# Patient Record
Sex: FEMALE | Race: WHITE | NOT HISPANIC OR LATINO | Employment: OTHER | URBAN - METROPOLITAN AREA
[De-identification: names, ages, dates, MRNs, and addresses within clinical notes are randomized per-mention and may not be internally consistent; named-entity substitution may affect disease eponyms.]

---

## 2017-07-13 ENCOUNTER — HISTORICAL (OUTPATIENT)
Dept: LAB | Facility: HOSPITAL | Age: 82
End: 2017-07-13

## 2017-07-13 LAB
BUN SERPL-MCNC: 37 MG/DL (ref 7–18)
CALCIUM SERPL-MCNC: 9 MG/DL (ref 8.5–10.1)
CHLORIDE SERPL-SCNC: 105 MMOL/L (ref 98–107)
CO2 SERPL-SCNC: 28 MMOL/L (ref 21–32)
CREAT SERPL-MCNC: 1.34 MG/DL (ref 0.55–1.02)
GLUCOSE SERPL-MCNC: 163 MG/DL (ref 74–106)
POTASSIUM SERPL-SCNC: 4.4 MMOL/L (ref 3.5–5.1)
SODIUM SERPL-SCNC: 140 MMOL/L (ref 136–145)

## 2017-08-02 ENCOUNTER — HISTORICAL (OUTPATIENT)
Dept: LAB | Facility: HOSPITAL | Age: 82
End: 2017-08-02

## 2017-08-02 LAB
FERRITIN SERPL-MCNC: 251.9 NG/ML (ref 8–388)
FOLATE SERPL-MCNC: 68.8 NG/ML (ref 3.1–17.5)
IRON SATN MFR SERPL: 29.3 % (ref 20–50)
IRON SERPL-MCNC: 90 MCG/DL (ref 50–175)
TIBC SERPL-MCNC: 307 MCG/DL (ref 250–450)
TRANSFERRIN SERPL-MCNC: 239 MG/DL (ref 200–360)
VIT B12 SERPL-MCNC: 385 PG/ML (ref 193–986)

## 2018-03-14 ENCOUNTER — HISTORICAL (OUTPATIENT)
Dept: ADMINISTRATIVE | Facility: HOSPITAL | Age: 83
End: 2018-03-14

## 2018-03-14 LAB
ABS NEUT (OLG): 2.7
ALBUMIN SERPL-MCNC: 4.6 GM/DL (ref 3.4–5)
ALBUMIN/GLOB SERPL: 2.04 {RATIO} (ref 1.5–2.5)
ALP SERPL-CCNC: 59 UNIT/L (ref 38–126)
ALT SERPL-CCNC: 13 UNIT/L (ref 7–52)
AST SERPL-CCNC: 21 UNIT/L (ref 15–37)
BILIRUB SERPL-MCNC: 0.6 MG/DL (ref 0.2–1)
BILIRUBIN DIRECT+TOT PNL SERPL-MCNC: 0.2 MG/DL (ref 0–0.5)
BUN SERPL-MCNC: 27 MG/DL (ref 7–18)
CALCIUM SERPL-MCNC: 9.5 MG/DL (ref 8.5–10)
CHLORIDE SERPL-SCNC: 103 MMOL/L (ref 98–107)
CO2 SERPL-SCNC: 31 MMOL/L (ref 21–32)
CREAT SERPL-MCNC: 0.96 MG/DL (ref 0.6–1.3)
CREAT/UREA NIT SERPL: 28.1
ERYTHROCYTE [DISTWIDTH] IN BLOOD BY AUTOMATED COUNT: 13.8 % (ref 11.5–17)
GGT SERPL-CCNC: 16 UNIT/L (ref 5–85)
GLOBULIN SER-MCNC: 2.3 GM/DL (ref 1.2–3)
GLUCOSE SERPL-MCNC: 108 MG/DL (ref 74–106)
HCT VFR BLD AUTO: 36.8 % (ref 37–47)
HGB BLD-MCNC: 12.3 GM/DL (ref 12–16)
LDH SERPL-CCNC: 161 UNIT/L (ref 140–271)
LYMPHOCYTES # BLD AUTO: 1.9 X10(3)/MCL (ref 0.6–3.4)
LYMPHOCYTES NFR BLD AUTO: 35.7 % (ref 13–40)
MCH RBC QN AUTO: 31.9 PG (ref 27–31.2)
MCHC RBC AUTO-ENTMCNC: 33 GM/DL (ref 32–36)
MCV RBC AUTO: 95 FL (ref 80–94)
MONOCYTES # BLD AUTO: 0.6 X10(3)/MCL (ref 0–1.8)
MONOCYTES NFR BLD AUTO: 11.5 % (ref 0.1–24)
NEUTROPHILS NFR BLD AUTO: 52.8 % (ref 47–80)
PLATELET # BLD AUTO: 155 X10(3)/MCL (ref 130–400)
PMV BLD AUTO: 10.7 FL
POTASSIUM SERPL-SCNC: 4.3 MMOL/L (ref 3.5–5.1)
PROT SERPL-MCNC: 6.9 GM/DL (ref 6.4–8.2)
RBC # BLD AUTO: 3.86 X10(6)/MCL (ref 4.2–5.4)
SODIUM SERPL-SCNC: 139 MMOL/L (ref 136–145)
TSH SERPL-ACNC: 3.15 MIU/ML (ref 0.35–4.94)
WBC # SPEC AUTO: 5.2 X10(3)/MCL (ref 4.5–11.5)

## 2018-09-19 ENCOUNTER — HISTORICAL (OUTPATIENT)
Dept: ADMINISTRATIVE | Facility: HOSPITAL | Age: 83
End: 2018-09-19

## 2018-09-19 LAB
ALBUMIN SERPL-MCNC: 4.6 GM/DL (ref 3.4–5)
ALBUMIN/GLOB SERPL: 1.77 {RATIO} (ref 1.5–2.5)
ALP SERPL-CCNC: 56 UNIT/L (ref 38–126)
ALT SERPL-CCNC: 11 UNIT/L (ref 7–52)
AST SERPL-CCNC: 20 UNIT/L (ref 15–37)
BILIRUB SERPL-MCNC: 0.5 MG/DL (ref 0.2–1)
BILIRUBIN DIRECT+TOT PNL SERPL-MCNC: 0.2 MG/DL (ref 0–0.5)
BILIRUBIN DIRECT+TOT PNL SERPL-MCNC: 0.3 MG/DL
BUN SERPL-MCNC: 24 MG/DL (ref 7–18)
CALCIUM SERPL-MCNC: 9.2 MG/DL (ref 8.5–10)
CHLORIDE SERPL-SCNC: 98 MMOL/L (ref 98–107)
CO2 SERPL-SCNC: 26 MMOL/L (ref 21–32)
CREAT SERPL-MCNC: 1.15 MG/DL (ref 0.6–1.3)
GLOBULIN SER-MCNC: 2.6 GM/DL (ref 1.2–3)
GLUCOSE SERPL-MCNC: 109 MG/DL (ref 74–106)
POTASSIUM SERPL-SCNC: 3.7 MMOL/L (ref 3.5–5.1)
PROT SERPL-MCNC: 7.2 GM/DL (ref 6.4–8.2)
SODIUM SERPL-SCNC: 135 MMOL/L (ref 136–145)
TSH SERPL-ACNC: 2.85 MIU/ML (ref 0.35–4.94)

## 2019-04-16 ENCOUNTER — HISTORICAL (OUTPATIENT)
Dept: LAB | Facility: HOSPITAL | Age: 84
End: 2019-04-16

## 2019-04-16 ENCOUNTER — HISTORICAL (OUTPATIENT)
Dept: ADMINISTRATIVE | Facility: HOSPITAL | Age: 84
End: 2019-04-16

## 2019-04-16 LAB
ALBUMIN SERPL-MCNC: 4.4 GM/DL (ref 3.4–5)
ALBUMIN/GLOB SERPL: 1.76 {RATIO} (ref 1.5–2.5)
ALP SERPL-CCNC: 45 UNIT/L (ref 38–126)
ALT SERPL-CCNC: 14 UNIT/L (ref 7–52)
AST SERPL-CCNC: 22 UNIT/L (ref 15–37)
BILIRUB SERPL-MCNC: 0.6 MG/DL (ref 0.2–1)
BILIRUBIN DIRECT+TOT PNL SERPL-MCNC: 0.1 MG/DL (ref 0–0.5)
BILIRUBIN DIRECT+TOT PNL SERPL-MCNC: 0.5 MG/DL
BUN SERPL-MCNC: 30 MG/DL (ref 7–18)
CALCIUM SERPL-MCNC: 9.2 MG/DL (ref 8.5–10)
CHLORIDE SERPL-SCNC: 102 MMOL/L (ref 98–107)
CO2 SERPL-SCNC: 31 MMOL/L (ref 21–32)
CREAT SERPL-MCNC: 1.05 MG/DL (ref 0.6–1.3)
GLOBULIN SER-MCNC: 2.5 GM/DL (ref 1.2–3)
GLUCOSE SERPL-MCNC: 111 MG/DL (ref 74–106)
POTASSIUM SERPL-SCNC: 4.2 MMOL/L (ref 3.5–5.1)
PREALB SERPL-MCNC: 23.9 MG/DL (ref 18–38)
PROT SERPL-MCNC: 6.9 GM/DL (ref 6.4–8.2)
SODIUM SERPL-SCNC: 140 MMOL/L (ref 136–145)
TSH SERPL-ACNC: 3.84 MIU/ML (ref 0.35–4.94)

## 2019-07-29 ENCOUNTER — HISTORICAL (OUTPATIENT)
Dept: ADMINISTRATIVE | Facility: HOSPITAL | Age: 84
End: 2019-07-29

## 2019-09-30 ENCOUNTER — HISTORICAL (OUTPATIENT)
Dept: ADMINISTRATIVE | Facility: HOSPITAL | Age: 84
End: 2019-09-30

## 2019-09-30 LAB
ALBUMIN SERPL-MCNC: 4.2 GM/DL (ref 3.4–5)
ALBUMIN/GLOB SERPL: 1.91 {RATIO} (ref 1.5–2.5)
ALP SERPL-CCNC: 60 UNIT/L (ref 38–126)
ALT SERPL-CCNC: 11 UNIT/L (ref 7–52)
AST SERPL-CCNC: 20 UNIT/L (ref 15–37)
BILIRUB SERPL-MCNC: 0.4 MG/DL (ref 0.2–1)
BILIRUBIN DIRECT+TOT PNL SERPL-MCNC: 0.1 MG/DL (ref 0–0.5)
BILIRUBIN DIRECT+TOT PNL SERPL-MCNC: 0.3 MG/DL
BUN SERPL-MCNC: 35 MG/DL (ref 7–18)
CALCIUM SERPL-MCNC: 9 MG/DL (ref 8.5–10)
CHLORIDE SERPL-SCNC: 104 MMOL/L (ref 98–107)
CO2 SERPL-SCNC: 25 MMOL/L (ref 21–32)
CREAT SERPL-MCNC: 1.17 MG/DL (ref 0.6–1.3)
DEPRECATED CALCIDIOL+CALCIFEROL SERPL-MC: 57.1 NG/ML (ref 30–80)
GLOBULIN SER-MCNC: 1.7 GM/DL (ref 1.2–3)
GLUCOSE SERPL-MCNC: 103 MG/DL (ref 74–106)
POTASSIUM SERPL-SCNC: 4.9 MMOL/L (ref 3.5–5.1)
PROT SERPL-MCNC: 6.4 GM/DL (ref 6.4–8.2)
SODIUM SERPL-SCNC: 137 MMOL/L (ref 136–145)
TSH SERPL-ACNC: 3.08 MIU/ML (ref 0.35–4.94)

## 2020-02-17 ENCOUNTER — HISTORICAL (OUTPATIENT)
Dept: ADMINISTRATIVE | Facility: HOSPITAL | Age: 85
End: 2020-02-17

## 2020-02-17 LAB
ALBUMIN SERPL-MCNC: 4.4 GM/DL (ref 3.4–5)
ALBUMIN/GLOB SERPL: 1.69 {RATIO} (ref 1.5–2.5)
ALP SERPL-CCNC: 59 UNIT/L (ref 38–126)
ALT SERPL-CCNC: 12 UNIT/L (ref 7–52)
AST SERPL-CCNC: 23 UNIT/L (ref 15–37)
BILIRUB SERPL-MCNC: 0.5 MG/DL (ref 0.2–1)
BILIRUBIN DIRECT+TOT PNL SERPL-MCNC: 0.1 MG/DL (ref 0–0.5)
BILIRUBIN DIRECT+TOT PNL SERPL-MCNC: 0.4 MG/DL
BUN SERPL-MCNC: 32 MG/DL (ref 7–18)
CALCIUM SERPL-MCNC: 9.4 MG/DL (ref 8.5–10)
CHLORIDE SERPL-SCNC: 104 MMOL/L (ref 98–107)
CO2 SERPL-SCNC: 29 MMOL/L (ref 21–32)
CREAT SERPL-MCNC: 1.12 MG/DL (ref 0.6–1.3)
GLOBULIN SER-MCNC: 2.6 GM/DL (ref 1.2–3)
GLUCOSE SERPL-MCNC: 102 MG/DL (ref 74–106)
POTASSIUM SERPL-SCNC: 4.7 MMOL/L (ref 3.5–5.1)
PROT SERPL-MCNC: 7 GM/DL (ref 6.4–8.2)
SODIUM SERPL-SCNC: 139 MMOL/L (ref 136–145)
TSH SERPL-ACNC: 5.9 MIU/ML (ref 0.35–4.94)

## 2020-08-17 ENCOUNTER — HISTORICAL (OUTPATIENT)
Dept: ADMINISTRATIVE | Facility: HOSPITAL | Age: 85
End: 2020-08-17

## 2020-08-17 LAB
ALBUMIN SERPL-MCNC: 4.8 GM/DL (ref 3.4–5)
ALBUMIN/GLOB SERPL: 1.92 {RATIO} (ref 1.5–2.5)
ALP SERPL-CCNC: 60 UNIT/L (ref 38–126)
ALT SERPL-CCNC: 11 UNIT/L (ref 7–52)
AST SERPL-CCNC: 22 UNIT/L (ref 15–37)
BILIRUB SERPL-MCNC: 0.4 MG/DL (ref 0.2–1)
BILIRUBIN DIRECT+TOT PNL SERPL-MCNC: 0.1 MG/DL (ref 0–0.5)
BILIRUBIN DIRECT+TOT PNL SERPL-MCNC: 0.3 MG/DL
BUN SERPL-MCNC: 39 MG/DL (ref 7–18)
CALCIUM SERPL-MCNC: 9.9 MG/DL (ref 8.5–10)
CHLORIDE SERPL-SCNC: 102 MMOL/L (ref 98–107)
CO2 SERPL-SCNC: 25 MMOL/L (ref 21–32)
CREAT SERPL-MCNC: 1.34 MG/DL (ref 0.6–1.3)
GLOBULIN SER-MCNC: 2.5 GM/DL (ref 1.2–3)
GLUCOSE SERPL-MCNC: 123 MG/DL (ref 74–106)
POTASSIUM SERPL-SCNC: 4.9 MMOL/L (ref 3.5–5.1)
PROT SERPL-MCNC: 7.3 GM/DL (ref 6.4–8.2)
SODIUM SERPL-SCNC: 137 MMOL/L (ref 136–145)
TSH SERPL-ACNC: 4.05 MIU/ML (ref 0.35–4.94)

## 2020-12-07 ENCOUNTER — HISTORICAL (OUTPATIENT)
Dept: ADMINISTRATIVE | Facility: HOSPITAL | Age: 85
End: 2020-12-07

## 2020-12-07 LAB — TSH SERPL-ACNC: 2.33 MIU/ML (ref 0.35–4.94)

## 2021-04-22 ENCOUNTER — HISTORICAL (OUTPATIENT)
Dept: ADMINISTRATIVE | Facility: HOSPITAL | Age: 86
End: 2021-04-22

## 2021-04-22 LAB — TSH SERPL-ACNC: 2.63 MIU/ML (ref 0.35–4.94)

## 2021-07-29 ENCOUNTER — HISTORICAL (OUTPATIENT)
Dept: ADMINISTRATIVE | Facility: HOSPITAL | Age: 86
End: 2021-07-29

## 2021-07-29 LAB
ABS NEUT (OLG): 3.8 X10(3)/MCL (ref 2.1–9.2)
ALBUMIN SERPL-MCNC: 4.6 GM/DL (ref 3.4–5)
ALBUMIN/GLOB SERPL: 1.92 {RATIO} (ref 1.5–2.5)
ALP SERPL-CCNC: 40 UNIT/L (ref 38–126)
ALT SERPL-CCNC: 13 UNIT/L (ref 7–52)
AST SERPL-CCNC: 22 UNIT/L (ref 15–37)
BILIRUB SERPL-MCNC: 0.4 MG/DL (ref 0.2–1)
BILIRUBIN DIRECT+TOT PNL SERPL-MCNC: 0.1 MG/DL (ref 0–0.5)
BILIRUBIN DIRECT+TOT PNL SERPL-MCNC: 0.3 MG/DL
BUN SERPL-MCNC: 31 MG/DL (ref 7–18)
CALCIUM SERPL-MCNC: 9.2 MG/DL (ref 8.5–10)
CHLORIDE SERPL-SCNC: 103 MMOL/L (ref 98–107)
CO2 SERPL-SCNC: 28 MMOL/L (ref 21–32)
CREAT SERPL-MCNC: 1.09 MG/DL (ref 0.6–1.3)
ERYTHROCYTE [DISTWIDTH] IN BLOOD BY AUTOMATED COUNT: 14.1 % (ref 11.5–17)
GLOBULIN SER-MCNC: 2.5 GM/DL (ref 1.2–3)
GLUCOSE SERPL-MCNC: 103 MG/DL (ref 74–106)
HCT VFR BLD AUTO: 34.4 % (ref 37–47)
HGB BLD-MCNC: 10.8 GM/DL (ref 12–16)
LYMPHOCYTES # BLD AUTO: 1.7 X10(3)/MCL (ref 0.6–3.4)
LYMPHOCYTES NFR BLD AUTO: 29 % (ref 13–40)
MCH RBC QN AUTO: 30.5 PG (ref 27–31.2)
MCHC RBC AUTO-ENTMCNC: 31 GM/DL (ref 32–36)
MCV RBC AUTO: 97 FL (ref 80–94)
MONOCYTES # BLD AUTO: 0.5 X10(3)/MCL (ref 0.1–1.3)
MONOCYTES NFR BLD AUTO: 8.2 % (ref 0.1–24)
NEUTROPHILS NFR BLD AUTO: 62.8 % (ref 47–80)
PLATELET # BLD AUTO: 136 X10(3)/MCL (ref 130–400)
PMV BLD AUTO: 10.8 FL (ref 9.4–12.4)
POTASSIUM SERPL-SCNC: 4.7 MMOL/L (ref 3.5–5.1)
PROT SERPL-MCNC: 7 GM/DL (ref 6.4–8.2)
RBC # BLD AUTO: 3.54 X10(6)/MCL (ref 4.2–5.4)
SODIUM SERPL-SCNC: 140 MMOL/L (ref 136–145)
TSH SERPL-ACNC: 3.27 MIU/ML (ref 0.35–4.94)
WBC # SPEC AUTO: 6 X10(3)/MCL (ref 4.5–11.5)

## 2021-11-29 ENCOUNTER — HISTORICAL (OUTPATIENT)
Dept: ADMINISTRATIVE | Facility: HOSPITAL | Age: 86
End: 2021-11-29

## 2021-11-29 LAB
ABS NEUT (OLG): 2.4 X10(3)/MCL (ref 2.1–9.2)
ERYTHROCYTE [DISTWIDTH] IN BLOOD BY AUTOMATED COUNT: 13.7 % (ref 11.5–17)
HCT VFR BLD AUTO: 33.5 % (ref 37–47)
HGB BLD-MCNC: 10.6 GM/DL (ref 12–16)
LYMPHOCYTES # BLD AUTO: 1.5 X10(3)/MCL (ref 0.6–3.4)
LYMPHOCYTES NFR BLD AUTO: 34.3 % (ref 13–40)
MCH RBC QN AUTO: 30.5 PG (ref 27–31.2)
MCHC RBC AUTO-ENTMCNC: 32 GM/DL (ref 32–36)
MCV RBC AUTO: 96 FL (ref 80–94)
MONOCYTES # BLD AUTO: 0.4 X10(3)/MCL (ref 0.1–1.3)
MONOCYTES NFR BLD AUTO: 9.5 % (ref 0.1–24)
NEUTROPHILS NFR BLD AUTO: 56.2 % (ref 47–80)
PLATELET # BLD AUTO: 156 X10(3)/MCL (ref 130–400)
PMV BLD AUTO: 10.9 FL (ref 9.4–12.4)
RBC # BLD AUTO: 3.48 X10(6)/MCL (ref 4.2–5.4)
TSH SERPL-ACNC: 2.69 MIU/ML (ref 0.35–4.94)
WBC # SPEC AUTO: 4.3 X10(3)/MCL (ref 4.5–11.5)

## 2022-04-11 ENCOUNTER — HISTORICAL (OUTPATIENT)
Dept: ADMINISTRATIVE | Facility: HOSPITAL | Age: 87
End: 2022-04-11

## 2022-04-24 VITALS
SYSTOLIC BLOOD PRESSURE: 128 MMHG | WEIGHT: 103.63 LBS | HEIGHT: 65 IN | OXYGEN SATURATION: 92 % | BODY MASS INDEX: 17.27 KG/M2 | DIASTOLIC BLOOD PRESSURE: 72 MMHG

## 2022-05-31 PROBLEM — R06.00 DYSPNEA: Status: ACTIVE | Noted: 2022-05-31

## 2022-05-31 PROBLEM — I10 PRIMARY HYPERTENSION: Status: ACTIVE | Noted: 2022-05-31

## 2022-05-31 PROBLEM — E03.9 HYPOTHYROIDISM: Status: ACTIVE | Noted: 2022-05-31

## 2022-05-31 PROBLEM — M81.0 AGE-RELATED OSTEOPOROSIS WITHOUT CURRENT PATHOLOGICAL FRACTURE: Status: ACTIVE | Noted: 2022-05-31

## 2022-06-13 PROCEDURE — 83921 ORGANIC ACID SINGLE QUANT: CPT | Performed by: FAMILY MEDICINE

## 2022-06-13 PROCEDURE — 82607 VITAMIN B-12: CPT | Performed by: FAMILY MEDICINE

## 2022-06-13 PROCEDURE — 82746 ASSAY OF FOLIC ACID SERUM: CPT | Performed by: FAMILY MEDICINE

## 2022-06-13 PROCEDURE — 83540 ASSAY OF IRON: CPT | Performed by: FAMILY MEDICINE

## 2022-11-30 PROBLEM — F41.9 ANXIETY: Status: ACTIVE | Noted: 2022-11-30

## 2022-11-30 PROBLEM — I07.1 TRICUSPID VALVE INSUFFICIENCY: Status: ACTIVE | Noted: 2022-11-30

## 2022-11-30 PROBLEM — J45.909 ASTHMA: Status: ACTIVE | Noted: 2022-11-30

## 2022-11-30 PROBLEM — G47.00 INSOMNIA: Status: ACTIVE | Noted: 2022-11-30

## 2022-11-30 PROBLEM — J30.2 SEASONAL ALLERGIC RHINITIS: Status: ACTIVE | Noted: 2022-11-30

## 2022-11-30 PROBLEM — I27.20 PULMONARY HYPERTENSION: Status: ACTIVE | Noted: 2022-11-30

## 2022-11-30 PROBLEM — H40.9 GLAUCOMA: Status: ACTIVE | Noted: 2022-11-30

## 2022-11-30 PROBLEM — F32.A DEPRESSIVE DISORDER: Status: ACTIVE | Noted: 2022-11-30

## 2022-11-30 PROBLEM — Z80.0 FAMILY HISTORY OF MALIGNANT NEOPLASM OF COLON: Status: ACTIVE | Noted: 2022-11-30

## 2022-11-30 PROBLEM — D64.9 ANEMIA: Status: ACTIVE | Noted: 2022-11-30

## 2022-11-30 PROBLEM — M48.50XA COMPRESSION FRACTURE OF VERTEBRA: Status: ACTIVE | Noted: 2022-11-30

## 2022-11-30 PROBLEM — I83.90 VARICOSE VEINS: Status: ACTIVE | Noted: 2022-11-30

## 2022-11-30 PROBLEM — M85.80 OSTEOPENIA: Status: ACTIVE | Noted: 2022-11-30

## 2023-03-16 PROBLEM — N18.32 STAGE 3B CHRONIC KIDNEY DISEASE: Status: ACTIVE | Noted: 2023-03-16

## 2024-09-05 PROBLEM — F03.90 DEMENTIA: Status: ACTIVE | Noted: 2024-09-05

## 2024-12-14 ENCOUNTER — HOSPITAL ENCOUNTER (EMERGENCY)
Facility: HOSPITAL | Age: 89
Discharge: HOME | End: 2024-12-14
Attending: STUDENT IN AN ORGANIZED HEALTH CARE EDUCATION/TRAINING PROGRAM
Payer: MEDICARE

## 2024-12-14 VITALS
TEMPERATURE: 98 F | DIASTOLIC BLOOD PRESSURE: 96 MMHG | WEIGHT: 110 LBS | HEIGHT: 64 IN | SYSTOLIC BLOOD PRESSURE: 165 MMHG | HEART RATE: 69 BPM | OXYGEN SATURATION: 99 % | RESPIRATION RATE: 17 BRPM | BODY MASS INDEX: 18.78 KG/M2

## 2024-12-14 DIAGNOSIS — M25.572 ACUTE LEFT ANKLE PAIN: ICD-10-CM

## 2024-12-14 DIAGNOSIS — S09.90XA CLOSED HEAD INJURY, INITIAL ENCOUNTER: Primary | ICD-10-CM
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
## 2024-12-14 DIAGNOSIS — S00.81XA ABRASION OF FOREHEAD, INITIAL ENCOUNTER: ICD-10-CM

## 2024-12-14 PROCEDURE — 25000003 PHARM REV CODE 250: Performed by: STUDENT IN AN ORGANIZED HEALTH CARE EDUCATION/TRAINING PROGRAM

## 2024-12-14 PROCEDURE — 99284 EMERGENCY DEPT VISIT MOD MDM: CPT | Mod: 25

## 2024-12-14 RX ORDER — BACITRACIN ZINC 500 UNIT/G
OINTMENT (GRAM) TOPICAL 2 TIMES DAILY
Qty: 30 G | Refills: 0 | Status: SHIPPED | OUTPATIENT
Start: 2024-12-14

## 2024-12-14 RX ORDER — BACITRACIN 500 [USP'U]/G
OINTMENT TOPICAL
Status: COMPLETED | OUTPATIENT
Start: 2024-12-14 | End: 2024-12-14

## 2024-12-14 RX ADMIN — BACITRACIN: 500 OINTMENT TOPICAL at 01:12

## 2024-12-14 NOTE — DISCHARGE INSTRUCTIONS

## 2024-12-14 NOTE — ED PROVIDER NOTES
Encounter Date: 12/14/2024    SCRIBE #1 NOTE: I, Twila Duran, am scribing for, and in the presence of,  Hadley Greenberg MD. I have scribed the following portions of the note - Other sections scribed: HPI, ROS, PE.       History     Chief Complaint   Patient presents with    Fall     Reports trip and fall at NH. Hematoma to forehead. -BT. -LOC. Baseline GCS 14.      Patient is a 89 y/o female with a PMHx of mitral valve prolapse, mitral valve regurgiation, HTN, and tricsupid regurgitation presents to the ED following a fall at nursing home. Patient denies remembering the fall. She denies abdominal pain or neck pain.     The history is provided by the patient and medical records. No  was used.     Review of patient's allergies indicates:   Allergen Reactions    Mirtazapine      Past Medical History:   Diagnosis Date    Compression fracture of vertebra     Diastolic dysfunction     Insomnia     Mitral valve prolapse     Mitral valve regurgitation     Pulmonary hypertension     Tricuspid regurgitation     Weight loss      Past Surgical History:   Procedure Laterality Date    BILATERAL SALPINGO-OOPHORECTOMY (BSO)      BLADDER SUSPENSION      BREAST BIOPSY      CATARACT EXTRACTION      FOOT SURGERY      HEMORRHOID SURGERY      TOTAL ABDOMINAL HYSTERECTOMY      VARICOSE VEIN SURGERY       Family History   Problem Relation Name Age of Onset    Other Mother          brain tumor    Colon cancer Father      Prostate cancer Father      Skin cancer Sister      Skin cancer Brother       Social History     Tobacco Use    Smoking status: Never    Smokeless tobacco: Never   Substance Use Topics    Alcohol use: Never    Drug use: Never     Review of Systems   Constitutional:  Negative for chills and fever.   HENT:  Negative for congestion, drooling and sore throat.    Eyes:  Negative for pain and visual disturbance.   Respiratory:  Negative for chest tightness, shortness of breath and wheezing.     Cardiovascular:  Negative for chest pain, palpitations and leg swelling.   Gastrointestinal:  Negative for abdominal pain, nausea and vomiting.   Genitourinary:  Negative for dysuria and hematuria.   Musculoskeletal:  Negative for back pain, neck pain and neck stiffness.   Skin:  Negative for pallor and rash.   Neurological:  Negative for weakness and numbness.   Hematological:  Does not bruise/bleed easily.       Physical Exam     Initial Vitals [12/14/24 1113]   BP Pulse Resp Temp SpO2   (!) 165/65 81 16 98 °F (36.7 °C) 99 %      MAP       --         Physical Exam    Nursing note and vitals reviewed.  Constitutional: She appears well-developed and well-nourished. She is not diaphoretic. She is active. No distress.   Elderly and frail appearing.   HENT:   Head: Normocephalic.   Nose: Nose normal. Mouth/Throat: Oropharynx is clear and moist.   Abrasion to the right side of forehead. Hemostatic.      Eyes: EOM are normal. Pupils are equal, round, and reactive to light.   Neck: Neck supple.   Normal range of motion.  Cardiovascular:  Normal rate and regular rhythm.           No murmur heard.  Palpable pulses.   Pulmonary/Chest: Breath sounds normal. No respiratory distress. She has no wheezes. She has no rales. She exhibits no tenderness.   Clear lung sounds bilaterally.    Abdominal: Abdomen is soft. She exhibits no distension. There is no abdominal tenderness.   Musculoskeletal:      Cervical back: Normal range of motion and neck supple.      Comments: Pelvis stable.   No deformity  No crepitus.     Neurological: She is alert. She has normal strength. No cranial nerve deficit or sensory deficit.   Skin: Skin is warm. Capillary refill takes less than 2 seconds. No rash noted.         ED Course   Procedures  Labs Reviewed - No data to display       Imaging Results              CT Head Without Contrast (Final result)  Result time 12/14/24 12:45:43      Final result by Jac Prakash MD (12/14/24 12:45:43)                    Impression:      Right frontal scalp hematoma without underlying fracture or acute intracranial abnormality.      Electronically signed by: Jac Prakash MD  Date:    12/14/2024  Time:    12:45               Narrative:    EXAMINATION:  CT HEAD WITHOUT CONTRAST    CLINICAL HISTORY:  Head trauma, moderate-severe;    TECHNIQUE:  Axial images of the head were obtained without IV contrast administration.  Coronal and sagittal reconstructions were provided.  Three dimensional and MIP images were obtained and evaluated.  Total DLP was 1670 mGy-cm. Dose lowering technique and automated exposure control were utilized for this exam.    COMPARISON:  None    FINDINGS:  There is normal brain formation.  There is diffuse cerebral atrophy.  There is periventricular and subcortical white matter hypodensity.  There is no hemorrhage, hydrocephalus, or midline shift.  There is no cytotoxic or vasogenic edema.  There is no intra or extra-axial fluid collection.  There is no herniation.    There is a right frontal scalp hematoma.  The underlying calvarium is intact.  There is no fracture.  The bilateral lenses have been replaced.  The paranasal sinuses and mastoid air cells are normally developed and free of disease.                                       CT Cervical Spine Without Contrast (Final result)  Result time 12/14/24 12:48:34      Final result by Andrea Jasso MD (12/14/24 12:48:34)                   Impression:      No acute osseous abnormalities.    Spondylosis.    Spondylolisthesis.      Electronically signed by: Andrea Jasso  Date:    12/14/2024  Time:    12:48               Narrative:    EXAMINATION:  CT CERVICAL SPINE WITHOUT CONTRAST    CLINICAL HISTORY:  Neck trauma (Age >= 65y);    TECHNIQUE:  Low dose axial images, sagittal and coronal reformations were performed though the cervical spine.  Contrast was not administered.    COMPARISON:  None    FINDINGS:  No evidence of acute fracture dislocation.  Vertebral  body heights are maintained.  Grade 1 anterolisthesis of C5 on C6.  Discogenic degenerative changes most pronounced C6-7.  Bone island in the T1 vertebral body.                                       X-Ray Ankle Complete Left (Final result)  Result time 12/14/24 11:58:40      Final result by Jac Prakash MD (12/14/24 11:58:40)                   Impression:      Diffuse soft tissue swelling without underlying osseous abnormality.      Electronically signed by: Jac Prakash MD  Date:    12/14/2024  Time:    11:58               Narrative:    EXAMINATION:  Three radiographic views of the LEFT ANKLE.    CLINICAL HISTORY:  Unspecified fall, initial encounter    TECHNIQUE:  3 radiographic views of the LEFT ANKLE.    COMPARISON:  None.    FINDINGS:  Three views of the left ankle demonstrate normal alignment.  There is no fracture.  There is no bony mass lesion.  The talar dome is intact.  The mortise joint is well aligned.  There is diffuse soft tissue swelling.                                       X-Ray Pelvis Routine AP (Final result)  Result time 12/14/24 11:58:13      Final result by Jac Prakash MD (12/14/24 11:58:13)                   Impression:      No acute abnormality of the pelvis.      Electronically signed by: Jac Prakash MD  Date:    12/14/2024  Time:    11:58               Narrative:    EXAMINATION:  Single radiographic views of the PELVIS.    CLINICAL HISTORY:  fall;    TECHNIQUE:  Single radiographic views of the PELVIS.    COMPARISON:  Pelvis radiograph 07/29/2019.    FINDINGS:  A single supine views of the pelvis demonstrate normal alignment.  There is no fracture.  There is no bony mass lesion.  There is no soft tissue swelling.                                       X-Ray Chest AP Portable (Final result)  Result time 12/14/24 11:57:52      Final result by Jac Prakash MD (12/14/24 11:57:52)                   Impression:      No acute cardiopulmonary abnormality.      Electronically signed by: Jac Prakash  MD  Date:    12/14/2024  Time:    11:57               Narrative:    EXAMINATION:  Single view chest radiograph.    CLINICAL HISTORY:  Unspecified fall, initial encounter    TECHNIQUE:  Single view of the chest.    COMPARISON:  Chest radiograph 05/31/2022.    FINDINGS:  The lungs are clear without consolidation or effusion.  There is no pneumothorax.  The cardiac silhouette is normal in size.  There is no acute osseous abnormality.                                       Medications   bacitracin ointment ( Topical (Top) Given 12/14/24 1300)     Medical Decision Making  Problems Addressed:  Abrasion of forehead, initial encounter: acute illness or injury  Acute left ankle pain: acute illness or injury  Closed head injury, initial encounter: acute illness or injury  Fall: acute illness or injury    Amount and/or Complexity of Data Reviewed  Radiology: ordered. Decision-making details documented in ED Course.    Risk  OTC drugs.    Differential diagnosis (includes but is not limited to):   ICH, TBI, closed head injury, laceration, abrasion, contusion, fracture, dislocation, abrasion, contusion, soft tissue injury    MDM Narrative  89-year-old female presents for evaluation after a trip and fall at her nursing home.  She reports that she did not lose consciousness.  CT scans negative for acute traumatic injury.  X-rays negative for acute traumatic injury.  Local wound care provided.  Patient remains afebrile and hemodynamically stable.  She remains at her baseline mental status.  She states she is ready to go back to her facility.  Strict return precautions discussed and understood.  Patient is stable for discharge with close follow up and close monitoring at her nursing facility.    Dispo: Discharge    My independent radiology interpretation: as above  Point of care US (independently performed and interpreted):   Decision rules/clinical scoring:     Sepsis Perfusion Assessment:     Amount and/or Complexity of Data  Reviewed  Independent historian: EMS   Summary of history: report received  External data reviewed: notes from previous ED visits and notes from clinic visits  Summary of data reviewed: Prior records reviewed  Risk and benefits of testing: discussed   Labs: ordered and reviewed  Radiology: ordered and independent interpretation performed (see above or ED course)  ECG/medicine tests: ordered and independent interpretation performed (see above or ED course)  Discussion of management or test interpretation with external provider(s): none   Summary of discussion:     Risk  OTC medications  Prescription drug management   Shared decision making     Critical Care  none    Data Reviewed/Counseling: I have personally reviewed the patient's vital signs, nursing notes, and other relevant tests, information, and imaging. I had a detailed discussion regarding the historical points, exam findings, and any diagnostic results supporting the discharge diagnosis. I personally performed the history, PE, MDM and procedures as documented above and agree with the scribe's documentation.    Portions of this note were dictated using voice recognition software. Although it was reviewed for accuracy, some inherent voice recognition errors may have occurred and may be present in this document.          Scribe Attestation:   Scribe #1: I performed the above scribed service and the documentation accurately describes the services I performed. I attest to the accuracy of the note.    Attending Attestation:           Physician Attestation for Scribe:  Physician Attestation Statement for Scribe #1: I, Hadley Greenberg MD, reviewed documentation, as scribed by Twila Duran in my presence, and it is both accurate and complete.             ED Course as of 12/31/24 1834   Sat Dec 14, 2024   1300 X-Ray Ankle Complete Left  Independently visualized/reviewed by me during the ED visit.  - No acute fracture or dislocation [MC]   1300 X-Ray Pelvis Routine  AP  Independently visualized/reviewed by me during the ED visit.  - No acute fracture or dislocation [MC]   1300 X-Ray Chest AP Portable  Independently visualized/reviewed by me during the ED visit.  - No lobar consolidation or PTX [MC]   1331 I have reassessed the patient.  Patient is resting comfortably, no acute distress.  Vital signs stable.  Discussed all results including incidental findings.  Discussed need for follow up and discussed return precautions.  Answered all questions at this time.  Hemodynamically stable for continued outpatient management. Patient verbalized understanding and agreed to plan.    [MC]      ED Course User Index  [MC] Hadley Greenberg MD                           Clinical Impression:  Final diagnoses:  [W19.XXXA] Fall  [S09.90XA] Closed head injury, initial encounter (Primary)  [M25.572] Acute left ankle pain  [S00.81XA] Abrasion of forehead, initial encounter          ED Disposition Condition    Discharge Stable          ED Prescriptions       Medication Sig Dispense Start Date End Date Auth. Provider    bacitracin 500 unit/gram Oint Apply topically 2 (two) times daily. 30 g 12/14/2024 -- Hadley Greenberg MD          Follow-up Information       Follow up With Specialties Details Why Contact Info    Ezequiel Glynn MD Family Medicine Schedule an appointment as soon as possible for a visit in 2 days  [address]  [facility] [id number]  [phone]      [facility] - Emergency Dept Emergency Medicine  If symptoms worsen [address] [id number] [phone]             Hadley Greenberg MD  12/31/24 5388

## 2024-12-14 NOTE — ED NOTES
Bed: 34  Expected date: 12/14/24  Expected time:   Means of arrival:   Comments:  89 F trip and fall w/ head strike. GCS 15. -BT. -LOC.

## 2024-12-27 PROCEDURE — 87077 CULTURE AEROBIC IDENTIFY: CPT | Performed by: FAMILY MEDICINE

## 2025-02-06 ENCOUNTER — DOCUMENT SCAN (OUTPATIENT)
Dept: HOME HEALTH SERVICES | Facility: HOSPITAL | Age: OVER 89
End: 2025-02-06
Payer: MEDICARE

## 2025-02-17 ENCOUNTER — EXTERNAL HOME HEALTH (OUTPATIENT)
Dept: HOME HEALTH SERVICES | Facility: HOSPITAL | Age: OVER 89
End: 2025-02-17
Payer: MEDICARE

## 2025-03-10 PROBLEM — R39.9 UTI SYMPTOMS: Status: ACTIVE | Noted: 2025-03-10

## 2025-03-20 ENCOUNTER — DOCUMENT SCAN (OUTPATIENT)
Dept: HOME HEALTH SERVICES | Facility: HOSPITAL | Age: OVER 89
End: 2025-03-20
Payer: MEDICARE